# Patient Record
Sex: MALE | Race: WHITE | NOT HISPANIC OR LATINO | Employment: FULL TIME | URBAN - METROPOLITAN AREA
[De-identification: names, ages, dates, MRNs, and addresses within clinical notes are randomized per-mention and may not be internally consistent; named-entity substitution may affect disease eponyms.]

---

## 2019-08-03 ENCOUNTER — OFFICE VISIT (OUTPATIENT)
Dept: URGENT CARE | Facility: PHYSICIAN GROUP | Age: 54
End: 2019-08-03
Payer: MEDICARE

## 2019-08-03 VITALS
SYSTOLIC BLOOD PRESSURE: 138 MMHG | DIASTOLIC BLOOD PRESSURE: 88 MMHG | OXYGEN SATURATION: 94 % | BODY MASS INDEX: 33.24 KG/M2 | HEART RATE: 91 BPM | WEIGHT: 259 LBS | HEIGHT: 74 IN | TEMPERATURE: 98.8 F

## 2019-08-03 DIAGNOSIS — L03.031 CELLULITIS OF TOE OF RIGHT FOOT: ICD-10-CM

## 2019-08-03 PROCEDURE — 99204 OFFICE O/P NEW MOD 45 MIN: CPT | Performed by: NURSE PRACTITIONER

## 2019-08-03 RX ORDER — CEPHALEXIN 500 MG/1
500 CAPSULE ORAL 3 TIMES DAILY
Qty: 30 CAP | Refills: 0 | Status: SHIPPED | OUTPATIENT
Start: 2019-08-03 | End: 2019-08-13

## 2019-08-03 RX ORDER — QUETIAPINE FUMARATE 300 MG/1
300 TABLET, FILM COATED ORAL 2 TIMES DAILY
COMMUNITY
End: 2022-03-25 | Stop reason: SDUPTHER

## 2019-08-03 RX ORDER — SULFAMETHOXAZOLE AND TRIMETHOPRIM 800; 160 MG/1; MG/1
1 TABLET ORAL 2 TIMES DAILY
Qty: 20 TAB | Refills: 0 | Status: SHIPPED | OUTPATIENT
Start: 2019-08-03 | End: 2019-08-13

## 2019-08-03 ASSESSMENT — PATIENT HEALTH QUESTIONNAIRE - PHQ9: CLINICAL INTERPRETATION OF PHQ2 SCORE: 0

## 2019-08-03 ASSESSMENT — ENCOUNTER SYMPTOMS
EYE PAIN: 0
NECK PAIN: 0
NAUSEA: 0
JOINT SWELLING: 0
WEAKNESS: 0
MYALGIAS: 0
ABDOMINAL PAIN: 0
FEVER: 0
DIZZINESS: 0
SHORTNESS OF BREATH: 0
SORE THROAT: 0
NUMBNESS: 0
VOMITING: 0
CHILLS: 0

## 2019-08-03 ASSESSMENT — PAIN SCALES - GENERAL: PAINLEVEL: 3=SLIGHT PAIN

## 2019-08-03 NOTE — LETTER
August 3, 2019         Patient: Rojas Love   YOB: 1965   Date of Visit: 8/3/2019           To Whom it May Concern:    Rojas Love was seen in my clinic on 8/3/2019. He may return to work on 8/6/19.    If you have any questions or concerns, please don't hesitate to call.        Sincerely,           CLAU Tatum  Electronically Signed

## 2019-08-03 NOTE — PROGRESS NOTES
Subjective:     Rojas Love is a 53 y.o. male who presents for Toe Pain (right 3rd digit)       Foot Problem   This is a new problem. The current episode started 1 to 4 weeks ago (infection of 4th toe of right foot. ). The problem occurs constantly. The problem has been unchanged. Pertinent negatives include no abdominal pain, chest pain, chills, fever, joint swelling (right 4th toe with redness and swellilng), myalgias, nausea, neck pain, numbness, rash, sore throat, vomiting or weakness. The symptoms are aggravated by walking (wearing boot for work). He has tried acetaminophen (abx 3 weeks ago, which helped, infection reoccured 3 days after abx stopped) for the symptoms. The treatment provided no relief.   History reviewed. No pertinent past medical history.History reviewed. No pertinent surgical history.  Social History     Socioeconomic History   • Marital status:      Spouse name: Not on file   • Number of children: Not on file   • Years of education: Not on file   • Highest education level: Not on file   Occupational History   • Not on file   Social Needs   • Financial resource strain: Not on file   • Food insecurity:     Worry: Not on file     Inability: Not on file   • Transportation needs:     Medical: Not on file     Non-medical: Not on file   Tobacco Use   • Smoking status: Current Every Day Smoker   • Smokeless tobacco: Never Used   Substance and Sexual Activity   • Alcohol use: Not on file   • Drug use: Not on file   • Sexual activity: Not on file   Lifestyle   • Physical activity:     Days per week: Not on file     Minutes per session: Not on file   • Stress: Not on file   Relationships   • Social connections:     Talks on phone: Not on file     Gets together: Not on file     Attends Oriental orthodox service: Not on file     Active member of club or organization: Not on file     Attends meetings of clubs or organizations: Not on file     Relationship status: Not on file   • Intimate partner  "violence:     Fear of current or ex partner: Not on file     Emotionally abused: Not on file     Physically abused: Not on file     Forced sexual activity: Not on file   Other Topics Concern   • Not on file   Social History Narrative   • Not on file    History reviewed. No pertinent family history. Review of Systems   Constitutional: Negative for chills and fever.   HENT: Negative for sore throat.    Eyes: Negative for pain.   Respiratory: Negative for shortness of breath.    Cardiovascular: Negative for chest pain.   Gastrointestinal: Negative for abdominal pain, nausea and vomiting.   Genitourinary: Negative for hematuria.   Musculoskeletal: Positive for joint pain. Negative for joint swelling (right 4th toe with redness and swellilng), myalgias and neck pain.   Skin: Negative for rash.   Neurological: Negative for dizziness, weakness and numbness.   No Known Allergies   Objective:   /88 (BP Location: Left arm, Patient Position: Sitting, BP Cuff Size: Adult)   Pulse 91   Temp 37.1 °C (98.8 °F)   Ht 1.88 m (6' 2\")   Wt 117.5 kg (259 lb)   SpO2 94%   BMI 33.25 kg/m²   Physical Exam   Constitutional: He is oriented to person, place, and time. He appears well-developed and well-nourished. No distress.   HENT:   Head: Normocephalic and atraumatic.   Eyes: Pupils are equal, round, and reactive to light. Conjunctivae and EOM are normal.   Cardiovascular: Normal rate and regular rhythm.   No murmur heard.  Pulmonary/Chest: Effort normal and breath sounds normal. No respiratory distress.   Abdominal: Soft. He exhibits no distension. There is no tenderness.   Musculoskeletal:        Right foot: There is tenderness and swelling. There is normal range of motion and no bony tenderness.        Feet:    Neurological: He is alert and oriented to person, place, and time. He has normal reflexes. No sensory deficit.   Skin: Skin is warm and dry.   Psychiatric: He has a normal mood and affect.         Assessment/Plan: "   Assessment    1. Cellulitis of toe of right foot  sulfamethoxazole-trimethoprim (BACTRIM DS) 800-160 MG tablet    cephALEXin (KEFLEX) 500 MG Cap   Patient is a 53-year-old male present with the stated above.  Right fourth phalanx appears to be infected consistent with cellulitis.  Patient previously treated with doxycycline at this time will start patient on Bactrim and Keflex.  Use over-the-counter pain reliever, such as acetaminophen (Tylenol), ibuprofen (Advil, Motrin) or naproxen (Aleve) as needed; follow package directions for dosing.  Patient given precautionary s/sx that mandate immediate follow up and evaluation in the ED. Advised of risks of not doing so.    DDX, Supportive care, and indications for immediate follow-up discussed with patient.    Instructed to return to clinic or nearest emergency department if we are not available for any change in condition, further concerns, or worsening of symptoms.    The patient demonstrated a good understanding and agreed with the treatment plan.

## 2019-08-26 ENCOUNTER — HOSPITAL ENCOUNTER (OUTPATIENT)
Dept: RADIOLOGY | Facility: MEDICAL CENTER | Age: 54
End: 2019-08-26
Attending: FAMILY MEDICINE
Payer: MEDICARE

## 2019-08-26 ENCOUNTER — OFFICE VISIT (OUTPATIENT)
Dept: URGENT CARE | Facility: PHYSICIAN GROUP | Age: 54
End: 2019-08-26
Payer: MEDICARE

## 2019-08-26 VITALS
WEIGHT: 259 LBS | RESPIRATION RATE: 16 BRPM | HEART RATE: 78 BPM | HEIGHT: 74 IN | TEMPERATURE: 97.9 F | BODY MASS INDEX: 33.24 KG/M2 | OXYGEN SATURATION: 97 % | SYSTOLIC BLOOD PRESSURE: 146 MMHG | DIASTOLIC BLOOD PRESSURE: 84 MMHG

## 2019-08-26 DIAGNOSIS — L03.031 CELLULITIS OF TOE OF RIGHT FOOT: ICD-10-CM

## 2019-08-26 PROCEDURE — 99213 OFFICE O/P EST LOW 20 MIN: CPT | Performed by: FAMILY MEDICINE

## 2019-08-26 PROCEDURE — 73660 X-RAY EXAM OF TOE(S): CPT | Mod: RT

## 2019-08-26 ASSESSMENT — ENCOUNTER SYMPTOMS
FEVER: 0
VOMITING: 0
SHORTNESS OF BREATH: 0

## 2019-08-26 ASSESSMENT — PAIN SCALES - GENERAL: PAINLEVEL: 6=MODERATE PAIN

## 2019-08-26 NOTE — PROGRESS NOTES
"Subjective:     Rojas Love is a 53 y.o. male who presents for Toe Pain (finished antibiotics, redness and swelling right toe )    HPI  Pt presents for evaluation of a recurrent problem   Pt with left great toe callus and right middle toe redness   Right middle telemetry cellulitis started the first week of June 2019 and [facility] and treated with doxycycline  Felt that the antibiotics helped but then erythema returned  Was given Bactrim and Keflex 3 weeks ago   Abx were a 10 day course and helped greatly, but again erythema returned after discontinuing antibiotics  Patient with a sore on the lateral aspect that he blames on a rubbing on the fourth toe  Has had some clear and purulent drainage from the toe  Toe is constantly painful and worse with ambulation    Review of Systems   Constitutional: Negative for fever.   Respiratory: Negative for shortness of breath.    Cardiovascular: Negative for chest pain.   Gastrointestinal: Negative for vomiting.   Skin: Positive for rash.     PMH: No past history of diabetes, + history of bipolar 2  MEDS:   Current Outpatient Medications:   •  Acetaminophen (TYLENOL 8 HOUR PO), Take  by mouth., Disp: , Rfl:   •  quetiapine (SEROQUEL) 300 MG tablet, Take 300 mg by mouth 2 times a day., Disp: , Rfl:   ALLERGIES: No Known Allergies  SURGHX: No past surgical history on file.  SOCHX:  reports that he has been smoking cigarettes. He has been smoking about 1.00 pack per day. He has never used smokeless tobacco. He reports that he drinks alcohol. He reports that he does not use drugs.  FH: Family history was reviewed, not contributing to acute infection     Objective:   /84   Pulse 78   Temp 36.6 °C (97.9 °F)   Resp 16   Ht 1.88 m (6' 2\")   Wt 117.5 kg (259 lb)   SpO2 97%   BMI 33.25 kg/m²     Physical Exam   Constitutional: He is oriented to person, place, and time. He appears well-developed and well-nourished. No distress.   HENT:   Head: " Normocephalic and atraumatic.   Neurological: He is alert and oriented to person, place, and time.   Skin: Skin is warm and dry. He is not diaphoretic.   Right third toe erythematous, warm, and with callus over the lateral aspect which is soft and has small amount of cracking and clear drainage in the center, no fluctuance or abscess appreciated   Psychiatric: He has a normal mood and affect. His behavior is normal. Judgment and thought content normal.     Assessment/Plan:   Assessment    1. Cellulitis of toe of right foot  Patient is a 53-year-old male with cellulitis of right middle toe.  He has been treated with multiple rounds of antibiotics which have helped some but not fully resolved his infection.  X-ray highly concerning for osteomyelitis and advised that he will likely need IV antibiotics to fully resolve this and may even need surgical intervention.  Patient is agreeable to be seen in [facility] and called ER to give report.  - DX-TOE(S) 2+ RIGHT; Future

## 2022-03-25 ENCOUNTER — OFFICE VISIT (OUTPATIENT)
Dept: URGENT CARE | Facility: PHYSICIAN GROUP | Age: 57
End: 2022-03-25
Payer: MEDICARE

## 2022-03-25 VITALS
SYSTOLIC BLOOD PRESSURE: 152 MMHG | TEMPERATURE: 98.8 F | RESPIRATION RATE: 16 BRPM | DIASTOLIC BLOOD PRESSURE: 90 MMHG | OXYGEN SATURATION: 96 % | WEIGHT: 280 LBS | BODY MASS INDEX: 35.94 KG/M2 | HEART RATE: 90 BPM | HEIGHT: 74 IN

## 2022-03-25 DIAGNOSIS — F31.81 BIPOLAR 2 DISORDER (HCC): ICD-10-CM

## 2022-03-25 PROCEDURE — 99213 OFFICE O/P EST LOW 20 MIN: CPT | Performed by: FAMILY MEDICINE

## 2022-03-25 RX ORDER — QUETIAPINE FUMARATE 300 MG/1
300 TABLET, FILM COATED ORAL
Qty: 30 TABLET | Refills: 0 | Status: SHIPPED | OUTPATIENT
Start: 2022-03-25 | End: 2022-06-27 | Stop reason: SDUPTHER

## 2022-03-26 NOTE — PROGRESS NOTES
"Chief Complaint   Patient presents with   • Medication Refill     Needs Rx filled up.           HPI:      Here for medication refill on seroquel.   He has been on this for many years for bipolar dx.   States this works well and he denies any side effects.     States mood is stable, currently/.    Denies any depression or manic sx.       Social History     Tobacco Use   • Smoking status: Current Every Day Smoker     Packs/day: 1.00     Types: Cigarettes   • Smokeless tobacco: Never Used   Vaping Use   • Vaping Use: Never used   Substance Use Topics   • Alcohol use: Yes     Comment: occational   • Drug use: Never             Review of Systems   Constitutional: Negative for fever, chills and malaise/fatigue.   Eyes: Negative for vision changes, d/c.    Respiratory: Negative for cough and sputum production.    Cardiovascular: Negative for chest pain and palpitations.   Gastrointestinal: Negative for nausea, vomiting, abdominal pain, diarrhea and constipation.   Genitourinary: Negative for dysuria, urgency and frequency.   Skin: Negative for rash or  itching.   Neurological: Negative for dizziness and tingling.   Psychiatric/Behavioral: Negative for depression.   Hematologic/lymphatic - denies bruising or excessive bleeding  All other systems reviewed and are negative.      OBJECTIVE.  /90   Pulse 90   Temp 37.1 °C (98.8 °F)   Resp 16   Ht 1.88 m (6' 2\")   Wt (!) 127 kg (280 lb)   SpO2 96%   HEENT - PERRLA, EOMI  Neuro - alert and oriented x3. CN 2-12 grossly intact.  Lungs - CTA.    Heart - regular rate and rhythm without murmur.  Psych:   Mood - good. Affect - appropriate.   No SI.   Thought process in linear and goal-directed.   Speech is normal rate and rhythm.  Judgement is good.         A/P:  1. Bipolar 2, chronic    Stable.     - quetiapine (SEROQUEL) 300 MG tablet; Take 1 Tablet by mouth at bedtime as needed.  Dispense: 30 Tablet; Refill: 0  - Referral to Behavioral Health      "

## 2022-04-27 ENCOUNTER — OFFICE VISIT (OUTPATIENT)
Dept: MEDICAL GROUP | Facility: CLINIC | Age: 57
End: 2022-04-27
Payer: MEDICARE

## 2022-04-27 VITALS
TEMPERATURE: 97.1 F | BODY MASS INDEX: 36.51 KG/M2 | HEIGHT: 74 IN | HEART RATE: 96 BPM | OXYGEN SATURATION: 96 % | DIASTOLIC BLOOD PRESSURE: 90 MMHG | WEIGHT: 284.5 LBS | SYSTOLIC BLOOD PRESSURE: 150 MMHG

## 2022-04-27 DIAGNOSIS — F31.81 BIPOLAR 2 DISORDER (HCC): ICD-10-CM

## 2022-04-27 DIAGNOSIS — Z00.00 HEALTH CARE MAINTENANCE: ICD-10-CM

## 2022-04-27 PROCEDURE — 99203 OFFICE O/P NEW LOW 30 MIN: CPT | Performed by: FAMILY MEDICINE

## 2022-04-27 RX ORDER — QUETIAPINE FUMARATE 300 MG/1
TABLET, FILM COATED ORAL
Qty: 90 TABLET | Refills: 0 | Status: SHIPPED | OUTPATIENT
Start: 2022-04-27 | End: 2022-10-10 | Stop reason: SDUPTHER

## 2022-04-27 NOTE — ASSESSMENT & PLAN NOTE
Checking lipids, CHEM panel, CBC and TSH.  I have asked Rojas to return in about 2 months to follow-up.  I will notify you of the results through [employer] before then though.

## 2022-04-27 NOTE — ASSESSMENT & PLAN NOTE
I will refill the Seroquel, 3 month supply.  I did ask if he is willing to meet with a Psychiatrist at least once.   He is agreeable, referral placed  F/u with me in 2 months.  Labs ordered.

## 2022-04-27 NOTE — PROGRESS NOTES
"Subjective:     CC:  [employer], sleep    HPI:   Rojas presents today to discuss the following issues     Problem   Health Care Maintenance    Has not had labs for maybe a couple of years.  He had what sounds like a Cologuard test several years ago that was negative.     Bipolar 2 Disorder (Hcc)    Blayne has been on Seroquel for about 20 years.  He was seen by psychiatry in a different state and diagnosed with bipolar disorder.  After a number of different medications were tried, including what sounds like lithium, quetiapine was seem to have the best effect with minimal side effects.  He has been using this on a regular basis but had been unable to get into see us to get his medicines refilled and ran out a couple of days ago.  His main concern is been of poor sleep since then.  He has not had any labs done for a couple of years and has not seen a psychiatrist for a while.           Current Outpatient Medications Ordered in Epic   Medication Sig Dispense Refill   • quetiapine (SEROQUEL) 300 MG tablet 90 90 Tablet 0   • quetiapine (SEROQUEL) 300 MG tablet Take 1 Tablet by mouth at bedtime as needed. 30 Tablet 0   • Acetaminophen (TYLENOL 8 HOUR PO) Take  by mouth.       No current Epic-ordered facility-administered medications on file.       Health Maintenance:     ROS:  Gen: no fevers/chills, no changes in weight  Eyes: no changes in vision  ENT: no sore throat, no hearing loss, no bloody nose  Pulm: no sob, no cough  CV: no chest pain, no palpitations  GI: no nausea/vomiting, no diarrhea  : no dysuria  MSk: no myalgias  Skin: no rash  Neuro: no headaches, no numbness/tingling  Heme/Lymph: no easy bruising      Objective:     Exam:  /90 (BP Location: Right arm, Patient Position: Sitting, BP Cuff Size: Adult)   Pulse 96   Temp 36.2 °C (97.1 °F) (Temporal)   Ht 1.88 m (6' 2\")   Wt (!) 129 kg (284 lb 8 oz)   SpO2 96%   BMI 36.53 kg/m²  Body mass index is 36.53 kg/m².    Gen: Alert and oriented, No apparent " distress.  Neck: Neck is supple without lymphadenopathy.  Lungs: Normal effort, CTA bilaterally, no wheezes, rhonchi, or rales  CV: Regular rate and rhythm. No murmurs, rubs, or gallops.  Ext: No clubbing, cyanosis, edema.          Assessment & Plan:     56 y.o. male with the following -     Problem List Items Addressed This Visit     Bipolar 2 disorder (HCC)     I will refill the Seroquel, 3 month supply.  I did ask if he is willing to meet with a Psychiatrist at least once.   He is agreeable, referral placed  F/u with me in 2 months.  Labs ordered.         Relevant Orders    Referral to Psychiatry    Comp Metabolic Panel    CBC WITH DIFFERENTIAL    Lipid Profile    TSH WITH REFLEX TO FT4    Health care maintenance     Checking lipids, CHEM panel, CBC and TSH.  I have asked Rojas to return in about 2 months to follow-up.  I will notify you of the results through [employer] before then though.         Relevant Orders    TSH WITH REFLEX TO FT4            No follow-ups on file.

## 2022-04-28 RX ORDER — QUETIAPINE FUMARATE 300 MG/1
300 TABLET, FILM COATED ORAL
Qty: 30 TABLET | Refills: 0 | OUTPATIENT
Start: 2022-04-28

## 2022-06-27 DIAGNOSIS — F31.81 BIPOLAR 2 DISORDER (HCC): ICD-10-CM

## 2022-06-27 NOTE — TELEPHONE ENCOUNTER
Patient will be out of town for 3 months for a job and would like a refill on this medication for 90 days. He had an appointment set up but had to cancel it due to the job he took. Please advise, Patient did said he will schedule an appointment when he gets back into town.     Received request via: Patient    Was the patient seen in the last year in this department? Yes    Does the patient have an active prescription (recently filled or refills available) for medication(s) requested? No

## 2022-06-28 RX ORDER — QUETIAPINE FUMARATE 300 MG/1
300 TABLET, FILM COATED ORAL
Qty: 90 TABLET | Refills: 0 | Status: SHIPPED | OUTPATIENT
Start: 2022-06-28 | End: 2022-09-26

## 2022-06-28 NOTE — TELEPHONE ENCOUNTER
Patient called again needing their medication because they are going out of town. I let them know I will forward to another provider because Hernandez might be out of office.

## 2022-10-10 ENCOUNTER — TELEPHONE (OUTPATIENT)
Dept: INTERNAL MEDICINE | Facility: OTHER | Age: 57
End: 2022-10-10

## 2022-10-10 ENCOUNTER — OFFICE VISIT (OUTPATIENT)
Dept: INTERNAL MEDICINE | Facility: OTHER | Age: 57
End: 2022-10-10
Payer: MEDICARE

## 2022-10-10 VITALS
HEART RATE: 88 BPM | DIASTOLIC BLOOD PRESSURE: 90 MMHG | SYSTOLIC BLOOD PRESSURE: 162 MMHG | WEIGHT: 291 LBS | TEMPERATURE: 98.1 F | HEIGHT: 74 IN | BODY MASS INDEX: 37.35 KG/M2 | OXYGEN SATURATION: 96 %

## 2022-10-10 DIAGNOSIS — I10 PRIMARY HYPERTENSION: ICD-10-CM

## 2022-10-10 DIAGNOSIS — N52.9 ERECTILE DYSFUNCTION, UNSPECIFIED ERECTILE DYSFUNCTION TYPE: ICD-10-CM

## 2022-10-10 DIAGNOSIS — F10.11 HISTORY OF ALCOHOL ABUSE: ICD-10-CM

## 2022-10-10 DIAGNOSIS — F31.81 BIPOLAR 2 DISORDER (HCC): ICD-10-CM

## 2022-10-10 DIAGNOSIS — Z00.00 PREVENTATIVE HEALTH CARE: ICD-10-CM

## 2022-10-10 PROCEDURE — G0008 ADMIN INFLUENZA VIRUS VAC: HCPCS

## 2022-10-10 PROCEDURE — 99214 OFFICE O/P EST MOD 30 MIN: CPT | Mod: 25,GC

## 2022-10-10 PROCEDURE — 90686 IIV4 VACC NO PRSV 0.5 ML IM: CPT

## 2022-10-10 RX ORDER — SILDENAFIL 50 MG/1
50 TABLET, FILM COATED ORAL
Qty: 9 TABLET | Refills: 3 | Status: SHIPPED | OUTPATIENT
Start: 2022-10-10 | End: 2022-11-23

## 2022-10-10 RX ORDER — QUETIAPINE FUMARATE 300 MG/1
TABLET, FILM COATED ORAL
Qty: 90 TABLET | Refills: 0 | Status: SHIPPED | OUTPATIENT
Start: 2022-10-10 | End: 2022-10-11

## 2022-10-10 RX ORDER — LISINOPRIL AND HYDROCHLOROTHIAZIDE 12.5; 1 MG/1; MG/1
1 TABLET ORAL DAILY
Qty: 90 TABLET | Refills: 0 | Status: SHIPPED | OUTPATIENT
Start: 2022-10-10 | End: 2023-01-11 | Stop reason: SDUPTHER

## 2022-10-10 NOTE — TELEPHONE ENCOUNTER
Patient called and is asking if you can sne din his medication for one po at bed time. The pharmacy won't fill it. Med is QUETIAPINE

## 2022-10-10 NOTE — PATIENT INSTRUCTIONS
Thank you for visiting today!  Please follow-up in 4 weeks   Please follow-up on referrals and schedule an appointment with a psychiatrist.   Please get lab work done at least 5 days before next visit.  Please try and eat healthy, get at least 30 minutes of cardiovascular exercise a day to help keep your health as best as it can be.    If you have any questions or concerns please feel free to contact us at [phone].  If you feel like you are experiencing a medical emergency please seek immediate medical attention at an urgent care or in the emergency department.

## 2022-10-10 NOTE — PROGRESS NOTES
Established Patient    Rojas Love is a 56 y.o. male who presents today with the following Chief Complaint(s): Refill of quetiapine 300 mg and to establish care with a new PCP.     HPI:  Mr Rojas Love is a 55 y/o M who was being seen by seen by Dr. Fadi Colindres M.D. who presented here today because he needs his medications refilled and to establish care with a new PCP.     He has a hx of bipolar 2 disorder and has been on quetiapine 300mg since 2002 without any problems. He says he is stable on that medication and dose and does not want to change. In 2002 when the diagnosis was made he was admitted to a psychiatric facility and was started on lithium but his sx did not resolve until placed on quetiapine.      He also noted a new onset of erectile dysfunction and requested something to help with that. We discussed that his smoking is likely contributing to this.     Pt has a long history of alcohol abuse, however, he quit 7 months ago after his wife went to rehab. He wanted to be supportive of her and has stopped drinking so that she can stay sober as well. He has had 0 drinks for the past 7 months and intends to stay sober.     Pt also has a 20 pk/yr smoking history. He said he has tried to quit before but hasn't been able to and is not interested in quitting at this time.     Preventative:   We discussed diet and exercise. He is fairly active as he works a s a [employer] but does not take time to exercise specifically. His diet is high in salt and he loves mexican food. He requested flu shot today.       No past medical history on file.  Social History     Tobacco Use    Smoking status: Every Day     Packs/day: 1.00     Types: Cigarettes    Smokeless tobacco: Never   Vaping Use    Vaping Use: Never used   Substance Use Topics    Alcohol use: Yes     Comment: occational    Drug use: Never     Current Outpatient Medications   Medication Sig Dispense Refill    quetiapine (SEROQUEL) 300 MG tablet 90 90  "Tablet 0    Acetaminophen (TYLENOL 8 HOUR PO) Take  by mouth.       No current facility-administered medications for this visit.     History reviewed. No pertinent surgical history.   Patient has no known allergies.     ROS: As per HPI. Additional pertinent systems as noted below.  Constitutional: Negative for chills and fever.   HENT: Negative for ear pain and sore throat.    Eyes: Negative for discharge and redness.   Respiratory: Negative for cough, hemoptysis, wheezing and stridor.  Positive for runny nose (allergies)   Cardiovascular: Negative for chest pain, palpitations and leg swelling.   Gastrointestinal: Negative for abdominal pain, constipation, diarrhea, heartburn, nausea and vomiting.   Genitourinary: Negative for dysuria, flank pain and hematuria.   Musculoskeletal: Negative for falls and myalgias.   Skin: Negative for itching and rash.   Neurological: Negative for dizziness, seizures, loss of consciousness and headaches.   Endo/Heme/Allergies: Negative for polydipsia. Does not bruise/bleed easily.   Psychiatric/Behavioral: Negative for substance abuse and suicidal ideas.     Physical Exam  BP (!) 162/90 (BP Location: Left arm, Patient Position: Sitting, BP Cuff Size: Adult)   Pulse 88   Temp 36.7 °C (98.1 °F) (Temporal)   Ht 1.88 m (6' 2\")   Wt (!) 132 kg (291 lb)   SpO2 96%   BMI 37.36 kg/m²   General:  Alert and oriented, No apparent distress. Overweight.     Eyes: Pupils equal and reactive. No scleral icterus.    Throat: Clear and moist. No erythema or exudates noted.    Neck: Supple. No lymphadenopathy noted. Thyroid not enlarged.    Lungs: Inspiratory wheezing at all lung posts. Bilateral expansion.     Cardiovascular: Regular rate and rhythm. No murmurs, rubs or gallops.    Abdomen:  Regular bowel sounds, nontender, no rebound or guarding noted.    Extremities: No clubbing, cyanosis, edema.    Skin: Clear. No rash or suspicious skin lesions noted.      Assessment and Plan:   1. Bipolar 2 " disorder  -Initially dxed in 2002 and has been on quetiapine 300mg since then.   Plan:   -refilled quetiapine 300mg.   -Referral to psychology to ensure pt stable.     2. Hypertension   -consistently elevated BP over previous visit.   -has not checked BP at home   -Repeat BP still elevated   Plan:   -Prescribed Lisinopril HCTZ combo   -Requested pt take blood pressure at home and bring log to our next visit.   -recommended low salt diet and discussed ways pt could decrease salt intake.     3. Erectile dysfunction   -sildenafil 50 as needed.     4. Preventative  -Patient will need CT for lung cancer screening   -will need pneumonia vaccine   Plan:   -We discussed diet/exercise. I advised reducing sugars/carbohydrates/saturated fats/alcohol, and eating more vegetables and lean meats such as fish/chicken/turkey. I also recommended 30 minutes of cardiovascular exercise most days of the week.  -We discussed smoking, patient not interested in quitting at this time.   -Flu shot today   -Labs: CBC, CMP, Lipid Panel, A1C         Follow up: 4 weeks     Merlin Zazueta D.O. PGY I  [facility]        At next appointment:   -f/u on CBC, CMP, Lipid Panel, A1C   -Blood pressure   -Diet   -pneumonia vaccine   -CT lungs

## 2022-10-10 NOTE — TELEPHONE ENCOUNTER
Pharmacy wasn't able to fill quetiapine because there weren't any instructions.  1 pill at bedtime.

## 2022-10-11 RX ORDER — QUETIAPINE FUMARATE 300 MG/1
300 TABLET, FILM COATED ORAL EVERY EVENING
Qty: 90 TABLET | Refills: 0 | Status: SHIPPED | OUTPATIENT
Start: 2022-10-11 | End: 2023-01-11 | Stop reason: SDUPTHER

## 2022-10-11 NOTE — TELEPHONE ENCOUNTER
Merlin Zazueta D.O.  You 2 hours ago (9:19 AM)     AN  Resent prescription with instructions.

## 2022-11-23 ENCOUNTER — OFFICE VISIT (OUTPATIENT)
Dept: INTERNAL MEDICINE | Facility: OTHER | Age: 57
End: 2022-11-23
Payer: MEDICARE

## 2022-11-23 VITALS
BODY MASS INDEX: 37.22 KG/M2 | WEIGHT: 290 LBS | SYSTOLIC BLOOD PRESSURE: 129 MMHG | HEART RATE: 100 BPM | DIASTOLIC BLOOD PRESSURE: 77 MMHG | TEMPERATURE: 98.3 F | HEIGHT: 74 IN | OXYGEN SATURATION: 97 %

## 2022-11-23 DIAGNOSIS — N52.9 ERECTILE DYSFUNCTION, UNSPECIFIED ERECTILE DYSFUNCTION TYPE: ICD-10-CM

## 2022-11-23 DIAGNOSIS — I10 PRIMARY HYPERTENSION: ICD-10-CM

## 2022-11-23 PROBLEM — Z00.00 HEALTH CARE MAINTENANCE: Status: RESOLVED | Noted: 2022-04-27 | Resolved: 2022-11-23

## 2022-11-23 PROCEDURE — 99214 OFFICE O/P EST MOD 30 MIN: CPT | Mod: GC

## 2022-11-23 RX ORDER — AMOXICILLIN 500 MG/1
CAPSULE ORAL
COMMUNITY
Start: 2022-11-12 | End: 2023-01-11

## 2022-11-23 RX ORDER — TADALAFIL 10 MG/1
20 TABLET ORAL
Qty: 10 TABLET | Refills: 3 | Status: SHIPPED | OUTPATIENT
Start: 2022-11-23 | End: 2023-01-11 | Stop reason: SDUPTHER

## 2022-11-23 NOTE — PATIENT INSTRUCTIONS
Thank you for visiting today!  Please follow-up in 6 weeks   Please follow-up on referrals and schedule an appointment with a nutritionist and with a pulmonologist for a sleep study.   Please get lab work done at least 5 days before next visit.    Please try and eat healthy, get at least 30 minutes of cardiovascular exercise a day to help keep your health as best as it can be. I highly recommend stopping drinking Soda and trying water flavoring instead (you can pick it up at [employer] for about $1).     If you have any questions or concerns please feel free to contact us at [phone].  If you feel like you are experiencing a medical emergency please seek immediate medical attention at an urgent care or in the emergency department.

## 2022-11-23 NOTE — PROGRESS NOTES
Established Patient    Rojas Love is a 57 y.o. male who presents today with the following Chief Complaint(s): Follow up for Diagnoses of Primary hypertension and Erectile dysfunction, unspecified erectile dysfunction type were pertinent to this visit.    HPI:  Hypertension:   He reported taking his medication regularly and said that when he has checked his BP at home it has been 'regular' with SBP in 120s. He said that he used to have frequent headaches and that since starting taking his BP medication his headaches have almost completely resolved. No sx lightheadedness or dizziness.     Erectile Dysfunction:   His main concern today is that he is still having difficulty with his ED. He tried taking the sildenafil and said that it didn't work. He then tried two pills and later tried 4 pills with hardly any affect. He said that he is in a good relationship with his wife and loves her very much. They have been together 12-13 years and he did not start having difficulty having an erection until about 3 years ago. He did report increased weight gain since that time.     Bipolar 2 Disorder:   At our previous appointment he expressed high stress, anxiety, and depression related to current financial situation. 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 He is very happy with current job, has a good support system, feels that he has a good relationship his wife, and is excited to spend time with his family tonight (for his wife's birthday) and tomorrow for Thanksgiving.     No past medical history on file.  Social History     Tobacco Use    Smoking status: Every Day     Packs/day: 1.00     Types: Cigarettes    Smokeless tobacco: Never   Vaping Use    Vaping Use: Never used   Substance Use Topics    Alcohol use: Not Currently     Comment: occational until around 7/2022 he quit to support his wife quitting.    Drug use:  "Never     Current Outpatient Medications   Medication Sig Dispense Refill    quetiapine (SEROQUEL) 300 MG tablet Take 1 Tablet by mouth every evening. 90 90 Tablet 0    sildenafil citrate (VIAGRA) 50 MG tablet Take 1 Tablet by mouth 1 time a day as needed for Erectile Dysfunction. 9 Tablet 3    lisinopril-hydrochlorothiazide (PRINZIDE) 10-12.5 MG per tablet Take 1 Tablet by mouth every day. 90 Tablet 0    Acetaminophen (TYLENOL 8 HOUR PO) Take  by mouth. (Patient not taking: Reported on 10/10/2022)       No current facility-administered medications for this visit.       ROS: As per HPI. Additional pertinent systems as noted below.  Constitutional: Negative for chills and fever.   HENT: Negative for ear pain and sore throat.    Eyes: Negative for discharge and redness.   Respiratory: Negative for cough, hemoptysis, wheezing and stridor.    Cardiovascular: Negative for chest pain, palpitations and leg swelling.   Gastrointestinal: Negative for abdominal pain, constipation, diarrhea, heartburn, nausea and vomiting.   Genitourinary: Negative for dysuria, flank pain and hematuria. Positive for erectile dysfunction.   Musculoskeletal: Negative for falls and myalgias.   Skin: Negative for itching and rash.   Neurological: Negative for dizziness, seizures, loss of consciousness and headaches.   Endo/Heme/Allergies: Negative for polydipsia. Does not bruise/bleed easily.   Psychiatric/Behavioral: Negative for substance abuse and suicidal ideas.     Physical Exam  /77 (BP Location: Left arm, Patient Position: Sitting, BP Cuff Size: Large adult)   Pulse 100   Temp 36.8 °C (98.3 °F) (Temporal)   Ht 1.88 m (6' 2\")   Wt (!) 132 kg (290 lb)   SpO2 97%   BMI 37.23 kg/m²   General:  Alert and oriented, No apparent distress.    HEENT: Normocephalic, atraumatic. No scleral icterus. Clear and moist. No erythema or exudates noted.    Neck: Supple. No lymphadenopathy noted. Trachea midline. Thyroid not enlarged.    Lungs: " Bilateral chest expansion. No rales, or rhonchi. Mild end-expiratory wheezing at lower lobes bilaterally.     Cardiovascular: Regular rate and rhythm. No murmurs, rubs or gallops.    Abdomen:  Regular bowel sounds, nontender, no rebound or guarding noted.    Extremities: No clubbing, cyanosis, edema.    Skin: Clear. No rash or suspicious skin lesions noted.      Assessment and Plan:   1. Primary hypertension  -Reported home BP of systolic 120s and said that he has decreased the amount of salt he adds to his food.   -He also noted that his headaches have almost completely resolved since getting his BP under control.   Plan:   -Encouraged to continue healthy diet decisions.   -Continue current medication (lisinopril-HCTZ combo)    2. Erectile dysfunction, unspecified erectile dysfunction type  Ddx low testosterone, weight, smoking, high cholesterol, DM, sleep disorder   -Sx not improved with sildenafil. Pt said he took 200mg (higher than recommended dose) and it hardly affected him.   -Do not think sx are psychological as he said that he is happy with his current relationship, loves his wife very much, and did not used to have difficulty when they were sexually active.   Plan:   -Discussed weight loss through diet and exercise. Discussed decreasing soda intake and trying water additives instead.   -Discussed how his sx are related to smoking and discussed quitting smoking, pt not ready to quit at this time.   -ordered serum testosterone level   -Lipid panel pending   -A1C pending   -Referral to Pulmonary and Sleep Medicine to assess for sleep apnea   -Discontinued sildenafil and prescribed tadalafil (CIALIS) 20 MG       Follow up: 1 month     Merlin Zazueta D.O. PGY I  [facility]      At next appointment:   -Review labs (CBC, CMP, Lipid Panel, A1C, Testosterone)   -Diet   -CT thorax for lung cancer screening   -Pneumonia vaccine   -Nutritionist referral?

## 2023-01-11 ENCOUNTER — OFFICE VISIT (OUTPATIENT)
Dept: INTERNAL MEDICINE | Facility: OTHER | Age: 58
End: 2023-01-11
Payer: MEDICARE

## 2023-01-11 VITALS
OXYGEN SATURATION: 94 % | DIASTOLIC BLOOD PRESSURE: 77 MMHG | BODY MASS INDEX: 37.09 KG/M2 | HEIGHT: 74 IN | HEART RATE: 84 BPM | TEMPERATURE: 98.3 F | SYSTOLIC BLOOD PRESSURE: 127 MMHG | WEIGHT: 289 LBS

## 2023-01-11 DIAGNOSIS — R20.0 NUMBNESS AND TINGLING OF BOTH FEET: ICD-10-CM

## 2023-01-11 DIAGNOSIS — F31.81 BIPOLAR 2 DISORDER (HCC): ICD-10-CM

## 2023-01-11 DIAGNOSIS — I10 PRIMARY HYPERTENSION: ICD-10-CM

## 2023-01-11 DIAGNOSIS — R20.2 NUMBNESS AND TINGLING OF BOTH FEET: ICD-10-CM

## 2023-01-11 DIAGNOSIS — Z00.00 PREVENTATIVE HEALTH CARE: ICD-10-CM

## 2023-01-11 DIAGNOSIS — N52.9 ERECTILE DYSFUNCTION, UNSPECIFIED ERECTILE DYSFUNCTION TYPE: ICD-10-CM

## 2023-01-11 PROBLEM — E66.9 OBESITY (BMI 30-39.9): Status: ACTIVE | Noted: 2023-01-11

## 2023-01-11 PROCEDURE — 99214 OFFICE O/P EST MOD 30 MIN: CPT | Mod: GC

## 2023-01-11 RX ORDER — QUETIAPINE FUMARATE 300 MG/1
300 TABLET, FILM COATED ORAL EVERY EVENING
Qty: 90 TABLET | Refills: 4 | Status: SHIPPED | OUTPATIENT
Start: 2023-01-11 | End: 2023-06-27 | Stop reason: SDUPTHER

## 2023-01-11 RX ORDER — TADALAFIL 10 MG/1
20 TABLET ORAL
Qty: 30 TABLET | Refills: 11 | Status: SHIPPED | OUTPATIENT
Start: 2023-01-11 | End: 2023-06-27 | Stop reason: SDUPTHER

## 2023-01-11 RX ORDER — LISINOPRIL AND HYDROCHLOROTHIAZIDE 12.5; 1 MG/1; MG/1
1 TABLET ORAL DAILY
Qty: 90 TABLET | Refills: 4 | Status: SHIPPED | OUTPATIENT
Start: 2023-01-11 | End: 2023-06-27 | Stop reason: SDUPTHER

## 2023-01-11 NOTE — PATIENT INSTRUCTIONS
For your sleep study please call:   [facility]  [address]  [place] [address]  Phone: [phone]    Please follow-up in 3 months   Please follow-up on referrals and schedule an appointment with sleep medicine.   Please get lab work done at least 5 days before next visit.  Please try and eat healthy, get at least 30 minutes of cardiovascular exercise a day to help keep your health as best as it can be.  If you have any questions or concerns please feel free to contact us at [phone].  If you feel like you are experiencing a medical emergency please seek immediate medical attention at an urgent care or in the emergency department.

## 2023-01-11 NOTE — PROGRESS NOTES
Established Patient    Rojas Love is a 57 y.o. male who presents today with the following Chief Complaint(s): Follow up for Diagnoses of Erectile dysfunction, unspecified erectile dysfunction type, Numbness and tingling of both feet, Preventative health care, Primary hypertension, and Bipolar 2 disorder (HCC) were pertinent to this visit.    HPI:   Erectile Dysfunction:   He has had trouble with ED for about 10 years. He said he has had some improvement with Cialis but is still not as hard as he used to be. His relationship with his wife is good. He forgot to complete his labs. Discussed smoking and weight loss.   -Diet: He said his diet could be better. He eats out a lot because it is convenient. However, he has been eating out less over the past few weeks because he has been working less.   -Exercise: He is usually fairly physically active at work and has been more active lately due to having to shovel snow. He said he is able to shovel about 1/2 hr before taking a break.     Numbness/tingling of toes bilaterally:   He said he has had some mild numbness/tingling of his toes and ball of his foot bilaterally with R>L for past couple months. He said his shoes fit well. No inciting event. Not alleviated or exacerbated by anything, just a constant mild numbness/tingling.     Primary Hypertension:   Does not currently regularly check his BP at home, when he did it was usually around 127/70s. Currently stable, just needed refills today.     Bipolar 2 Disorder:   Stable on current medication, just needed refills today.     Screening Exams:   -Colonoscopy- Had cologuard about 3.5 years ago. Ordered repeat on 1/11/23.   -HIV, Syphilis, Chlamydia, and Gonorrhea- had previously, declined  repeat testing.   -Has about 20 pk/yr hx.- Will order CT Thorax and Abd US at upcoming appointment.   Vaccines:   -Influenza vaccination: got about 2 months ago.   -Tdap/TD: Unknown when had last vaccine, thinks he is up to  "date.   -Zoster vaccine: did not have chicken pox as a child.   -COVID: has had 4 shots.     History reviewed. No pertinent past medical history.  Social History     Tobacco Use    Smoking status: Every Day     Packs/day: 1.00     Types: Cigarettes    Smokeless tobacco: Never   Vaping Use    Vaping Use: Never used   Substance Use Topics    Alcohol use: Not Currently     Comment: occational until around 7/2022 he quit to support his wife quitting.    Drug use: Never     Current Outpatient Medications   Medication Sig Dispense Refill    quetiapine (SEROQUEL) 300 MG tablet Take 1 Tablet by mouth every evening. 90 90 Tablet 4    lisinopril-hydrochlorothiazide (PRINZIDE) 10-12.5 MG per tablet Take 1 Tablet by mouth every day. 90 Tablet 4    tadalafil (CIALIS) 10 MG tablet Take 2 Tablets by mouth 1 time a day as needed for Erectile Dysfunction. 30 Tablet 11     No current facility-administered medications for this visit.       ROS: As per HPI. Additional pertinent systems as noted below.  Constitutional: Negative for chills and fever.   HENT: Negative for ear pain and sore throat.    Eyes: Negative for discharge and redness.   Respiratory: Negative for cough, hemoptysis, wheezing and stridor.    Cardiovascular: Negative for chest pain, palpitations and leg swelling.   Gastrointestinal: Negative for abdominal pain, constipation, diarrhea, heartburn, nausea and vomiting.   Genitourinary: Negative for dysuria, flank pain and hematuria.   Musculoskeletal: Negative for falls and myalgias.   Skin: Negative for itching and rash.   Neurological: Negative for dizziness, seizures, loss of consciousness and headaches. Positive for numbness/tingling of toes bilaterally.     Physical Exam  /77 (BP Location: Left arm, Patient Position: Sitting, BP Cuff Size: Large adult)   Pulse 84   Temp 36.8 °C (98.3 °F) (Temporal)   Ht 1.88 m (6' 2\")   Wt (!) 131 kg (289 lb)   SpO2 94%   BMI 37.11 kg/m²   General:  Alert and oriented, No " apparent distress.    HEENT: Normocephalic, atraumatic. No scleral icterus. Mucous membranes clear and moist, no erythema or exudates noted.    Neck: Supple. No lymphadenopathy noted.    Lungs: Clear to auscultation. No wheezes, rales, or rhonchi.     Cardiovascular: Regular rate and rhythm. No murmurs, rubs or gallops.    Abdomen:  Regular bowel sounds, nontender, no rebound or guarding noted.    Extremities: No clubbing, cyanosis, edema. Varicose veins present ant tib bilat with L>R.     Skin: Clear. No rashes or bruising.       Assessment and Plan:   1. Erectile dysfunction, unspecified erectile dysfunction type  Ddx likely multifactorial due to smoking and weight. Possible low testosterone, high cholesterol, DM, or sleep disorder.   -Some improvement with tadalafil He said it is working better than sildenafil did.   Plan:   -Reiterated importance of lab-work (previously ordered testosterone, lipid panel, A1C)  -Provided sleep medicine referral information.   -Discussed smoking cessation and weight loss. Recommended eating out less frequently, decreasing salt intake, and increase fruit and vegetable intake.   -Refilled tadalafil (CIALIS) 10 MG tablet; Take 2 Tablets by mouth 1 time a day as needed for Erectile Dysfunction.  Dispense: 30 Tablet; Refill: 11    2. Numbness and tingling of both feet  Ddx peripheral neuropathy (hypoTH, B12 defic, folate defic, vascular, vs weight or poor fitting shoes) vs diabetic neuropathy.   Plan:   -Re-printed lab orders for A1C, Lipid Panel   -VITAMIN B12; Future  -FOLATE; Future  -TSH+FREE T4    3. Preventative health care  -Colonoscopy- Had cologuard about 3.5 years ago. Ordered repeat on 1/11/23.   -HIV, Syphilis, Chlamydia, and Gonorrhea- had previously, declined  repeat testing.   -Has about 20 pk/yr hx.- Will order CT Thorax and Abd US at upcoming appointment.   -Influenza vaccination: got about 2 months ago.   -Tdap/TD: Unknown when had last vaccine, thinks he is up to  date.   -Zoster vaccine: did not have chicken pox as a child.   -COVID: has had 4 shots.   Plan:   -Has about 20 pk/yr hx.- Will order CT Thorax and Abd US at upcoming appointment.   -Ordered COLOGUARD (FIT DNA)    4. Primary hypertension  -Stable on current medication. Home BP 120s/70s.   -127/77 in clinic today. Repeat was 122/78.   Plan:   -Refilled lisinopril-hydrochlorothiazide (PRINZIDE) 10-12.5 MG per tablet; Take 1 Tablet by mouth every day.  Dispense: 90 Tablet; Refill: 4    5. Bipolar 2 disorder (HCC)  -Dxed in 2002, has been on Quetiapine 300mg since then. Stable on current medication.   Plan:   -Refilled quetiapine (SEROQUEL) 300 MG tablet; Take 1 Tablet by mouth every evening. 90  Dispense: 90 Tablet; Refill: 4      Follow up: 3  months   F/U on:   -Order CT Thorax   -Cologbernadette   -Sleep medicine   -Labs     Merlin Zazueta D.O. PGY I  [facility]

## 2023-03-31 LAB — HBA1C MFR BLD: 6.1 % (ref ?–5.8)

## 2023-04-03 ENCOUNTER — OFFICE VISIT (OUTPATIENT)
Dept: INTERNAL MEDICINE | Facility: OTHER | Age: 58
End: 2023-04-03
Payer: MEDICARE

## 2023-04-03 ENCOUNTER — HOSPITAL ENCOUNTER (OUTPATIENT)
Facility: MEDICAL CENTER | Age: 58
End: 2023-04-03
Payer: MEDICARE

## 2023-04-03 VITALS
DIASTOLIC BLOOD PRESSURE: 74 MMHG | SYSTOLIC BLOOD PRESSURE: 118 MMHG | HEART RATE: 90 BPM | WEIGHT: 301 LBS | OXYGEN SATURATION: 94 % | HEIGHT: 73 IN | TEMPERATURE: 98.5 F | BODY MASS INDEX: 39.89 KG/M2

## 2023-04-03 DIAGNOSIS — J06.9 UPPER RESPIRATORY TRACT INFECTION, UNSPECIFIED TYPE: ICD-10-CM

## 2023-04-03 DIAGNOSIS — E78.5 HYPERLIPIDEMIA, UNSPECIFIED HYPERLIPIDEMIA TYPE: ICD-10-CM

## 2023-04-03 DIAGNOSIS — N52.9 ERECTILE DYSFUNCTION, UNSPECIFIED ERECTILE DYSFUNCTION TYPE: ICD-10-CM

## 2023-04-03 DIAGNOSIS — E66.09 CLASS 2 OBESITY DUE TO EXCESS CALORIES WITH BODY MASS INDEX (BMI) OF 39.0 TO 39.9 IN ADULT, UNSPECIFIED WHETHER SERIOUS COMORBIDITY PRESENT: ICD-10-CM

## 2023-04-03 DIAGNOSIS — R73.03 PREDIABETES: ICD-10-CM

## 2023-04-03 PROCEDURE — U0005 INFEC AGEN DETEC AMPLI PROBE: HCPCS

## 2023-04-03 PROCEDURE — U0003 INFECTIOUS AGENT DETECTION BY NUCLEIC ACID (DNA OR RNA); SEVERE ACUTE RESPIRATORY SYNDROME CORONAVIRUS 2 (SARS-COV-2) (CORONAVIRUS DISEASE [COVID-19]), AMPLIFIED PROBE TECHNIQUE, MAKING USE OF HIGH THROUGHPUT TECHNOLOGIES AS DESCRIBED BY CMS-2020-01-R: HCPCS

## 2023-04-03 PROCEDURE — 99214 OFFICE O/P EST MOD 30 MIN: CPT | Mod: GC

## 2023-04-03 RX ORDER — ATORVASTATIN CALCIUM 40 MG/1
40 TABLET, FILM COATED ORAL NIGHTLY
Qty: 90 TABLET | Refills: 3 | Status: SHIPPED | OUTPATIENT
Start: 2023-04-03 | End: 2023-06-27 | Stop reason: SDUPTHER

## 2023-04-03 NOTE — PROGRESS NOTES
Teaching Physician Attestation      Level of Participation    I have personally interviewed and examined the patient.  In addition, I discussed with the resident physician the patient's history, exam, assessment and plan in detail.  Topics listed in my addendum were the focus of the visit.  Healthcare maintenance was not addressed this visit unless listed as a topic in my addendum.  I agree with the plan as written along with the following additions/modifications:      Prediabetes, HTN, elevated ldl in setting of morbid obesity  -healthy eating handout given  -ascvd 18%, given multiple risk factors including morbid obesity, smoking, prediabetes, hypertension, significantly elevated LDL, will begin high intensity atorvastatin 40 mg.  LFTs normal 3/31.  Check lipids in 2-3 months.  -bp at goal today, no symptoms, continue lisinopril/HCTZ.  CMP recently within normal limits, no prior CMP for comparison.  -elevated TG, encouraged diet/exercise as aboe      Mildy leukocytosis in setting of likely viral uri  -2-3 days of congestion and cough, no fevers, chills, vomiting, chest pain, shortness of breath, nontoxic, clear lungs, saturating normally, states he feels as if he is improving.  Given he is incompletely immunized for COVID, is morbidly obese, is a smoker, and is over the age of 50, will swab for COVID and treat with Paxlovid if positive.  If negative supportive care.        Erythrocytosis  -repeating cbc to trend after resolution of sx as above.  Could be related to tobacco use.      History of erectile dysfunction  -Not discussed today, but did follow-up on mildly low testosterone, explained the pathophysiology of morbid obesity likely contributing to this.  Also, will require cautious monitoring for cardiovascular disease given risk factors, although patient currently denies chest pain shortness of breath.      Hx peripheral paresthesias not fully addressed, but b12 nl from prior labs.  Will need dedicted f/u for  this.    Counseled any chest pain or shortness of breath lasting more than 5 minutes patient should go to the emergency room.    Return to clinic 6-12 weeks.

## 2023-04-03 NOTE — PROGRESS NOTES
Established Patient    Rojas Love is a 57 y.o. male who presents today with the following Chief Complaint(s): Follow up for Diagnoses of Class 2 obesity , Hyperlipidemia, unspecified hyperlipidemia type, Prediabetes, Erectile dysfunction, unspecified erectile dysfunction type, and Upper respiratory tract infection, unspecified type were pertinent to this visit.    HPI:  Class II Obesity:   Pt has increased weight since previous appointment and expressed concern regarding recent lab work. He said that he has been slowly trying to improve his diet and exercise. He has started eating more chinese food (to increase his vegetabel intake) and has stopped drinking soda. He has quit drinking soda before but feels like he has a habit of purchasing soda at the [employer] and reported that yesterday he had to stop himself from buying soda. He also said he has been trying to go on walks with his wife but wants to be better. He is usually fairly physically active at work and has been more active lately due to having to shovel snow and said he is able to shovel about 1/2 hr before taking a break.     Erectile Dysfunction:   No change in sx from previous visit.     HTN:   If he forgets to take his medications his BP increases and he will get headaches and SBP will increase to 160s. However, when he remembers to take his medications his BP is 120s/80s. No sx of light-headed feeling or dizziness.     Screening:   -Colonoscopy- Had cologuard about 3.5 years ago. Ordered repeat on 1/11/23.   -HIV, Syphilis, Chlamydia, and Gonorrhea- had previously, negative.   -Has about 20 pk/yr hx.- Will order CT Thorax and Abd US at upcoming appointment.   -Influenza vaccination: 11/2022  -Tdap/TD: thinks he is up to date.   -Zoster vaccine: did not have chicken pox as a child.   -COVID: has had 4 shots.     No past medical history on file.  Social History     Tobacco Use    Smoking status: Every Day     Packs/day: 1.00     Types:  "Cigarettes    Smokeless tobacco: Never   Vaping Use    Vaping Use: Never used   Substance Use Topics    Alcohol use: Not Currently     Comment: occational until around 7/2022 he quit to support his wife quitting.    Drug use: Never     Current Outpatient Medications   Medication Sig Dispense Refill    atorvastatin (LIPITOR) 40 MG Tab Take 1 Tablet by mouth every evening. 90 Tablet 3    quetiapine (SEROQUEL) 300 MG tablet Take 1 Tablet by mouth every evening. 90 90 Tablet 4    lisinopril-hydrochlorothiazide (PRINZIDE) 10-12.5 MG per tablet Take 1 Tablet by mouth every day. 90 Tablet 4    tadalafil (CIALIS) 10 MG tablet Take 2 Tablets by mouth 1 time a day as needed for Erectile Dysfunction. 30 Tablet 11     No current facility-administered medications for this visit.       ROS: As per HPI. Additional pertinent systems as noted below.  Constitutional: Negative for chills and fever.   HENT: Negative for ear pain and sore throat. Positive for occasional runny nose.   Respiratory: Negative for cough, hemoptysis, wheezing and stridor.    Cardiovascular: Negative for chest pain, palpitations and leg swelling.   Gastrointestinal: Negative for abdominal pain, constipation, diarrhea, heartburn, nausea and vomiting.   Genitourinary: Negative for dysuria, flank pain and hematuria.   Musculoskeletal: Negative for falls and myalgias.   Skin: Negative for itching and rash.   Neurological: Negative for dizziness, seizures, loss of consciousness and headaches.     Physical Exam  /74 (BP Location: Left arm, Patient Position: Sitting, BP Cuff Size: Large adult)   Pulse 90   Temp 36.9 °C (98.5 °F) (Temporal)   Ht 1.854 m (6' 1\")   Wt (!) 137 kg (301 lb)   SpO2 94%   BMI 39.71 kg/m²   General:  Alert and oriented, No apparent distress.    HEENT: Normocephalic, atraumatic. No scleral icterus. Mucous membranes clear and moist. No erythema or exudates noted.    Neck: Supple. No lymphadenopathy noted. Thyroid not " enlarged.    Lungs: Clear to auscultation. No wheezes, rales, or rhonchi.     Cardiovascular: Regular rate and rhythm. No murmurs, rubs or gallops.    Abdomen:  Regular bowel sounds, nontender, no rebound or guarding noted.    Extremities: No clubbing, cyanosis, edema. Bilat lower extremities have small varicose veins.     Neuro: Aox4, 5/5 strength bilat upper and lower extremities.       Assessment and Plan:   1. Class 2 obesity   2. Hyperlipidemia, unspecified hyperlipidemia type  3. Prediabetes  -Recent labs notable for A1C 6.1, Chol 206, HDL 33, and Tri 490. He meets Dx criteria for prediabetes and hyperlipidemia.   -ASCVD score of 18% due to multiple risk factors or obesity, smoking, htn, and elevated LDL.   Plan:   -discussed ASCVD results and prescribed atorvastatin (LIPITOR) 40 MG Tab; Take 1 Tablet by mouth every evening.  Dispense: 90 Tablet; Refill: 3  -discussed diet/exercise and provided handout with dietary recommendations.   -currently he is working on quitting soda and has successfully gone 2 days without any soda. Encouraged pt to continue making small changes.   -Pt also reported increasing amount of vegetables in his diet and said that he is trying to eat more chinese food. Discussed and recommended mediterranean diet.     4. Erectile dysfunction, unspecified erectile dysfunction type  Ddx likely multifactorial due to smoking and weight. Possible low testosterone, high cholesterol, DM, or sleep disorder.   -Some improvement with tadalafil; reported that it is working better than sildenafil.   -Recent labs notable for low testosterone   Plan:   -Explained how weight can increase estrogen and decrease testosterone.   -Discussed smoking cessation and weight loss (see plan for Class II Obesity)     5. Upper respiratory tract infection, unspecified type  -Recent labs notable for elevated WBCs, likely secondary to URI. Reported 2-3 days contestion. No fevers, chills, shortness of breath. (see full ROS  above).   Plan:   - SARS-CoV-2, PCR (In-House); Future  -Given that he is high risk for complications of COVID if COVID positive, ordered COVID test and will prescribe Paxlovid if positive.       Follow up: 3 months     Merlin Zazueta D.O. PGY I  [facility]

## 2023-04-03 NOTE — PATIENT INSTRUCTIONS
Thank you for visiting today!  Please follow-up in 3 months.   Please try and eat healthy, get at least 30 minutes of cardiovascular exercise a day to help keep your health as best as it can be. Keep working on quitting soda, you can do it!     If you have any questions or concerns please feel free to contact us at [phone].  If you feel like you are experiencing a medical emergency please seek immediate medical attention at an urgent care or in the emergency department.

## 2023-04-04 DIAGNOSIS — J06.9 UPPER RESPIRATORY TRACT INFECTION, UNSPECIFIED TYPE: ICD-10-CM

## 2023-04-05 LAB
SARS-COV-2 RNA RESP QL NAA+PROBE: NOTDETECTED
SPECIMEN SOURCE: NORMAL

## 2023-06-27 ENCOUNTER — OFFICE VISIT (OUTPATIENT)
Dept: INTERNAL MEDICINE | Facility: OTHER | Age: 58
End: 2023-06-27
Payer: MEDICARE

## 2023-06-27 VITALS
SYSTOLIC BLOOD PRESSURE: 135 MMHG | OXYGEN SATURATION: 93 % | HEIGHT: 74 IN | HEART RATE: 87 BPM | BODY MASS INDEX: 37.37 KG/M2 | WEIGHT: 291.2 LBS | DIASTOLIC BLOOD PRESSURE: 66 MMHG | TEMPERATURE: 97.8 F

## 2023-06-27 DIAGNOSIS — I10 PRIMARY HYPERTENSION: ICD-10-CM

## 2023-06-27 DIAGNOSIS — E78.5 HYPERLIPIDEMIA, UNSPECIFIED HYPERLIPIDEMIA TYPE: ICD-10-CM

## 2023-06-27 DIAGNOSIS — N52.9 ERECTILE DYSFUNCTION, UNSPECIFIED ERECTILE DYSFUNCTION TYPE: ICD-10-CM

## 2023-06-27 DIAGNOSIS — F31.81 BIPOLAR 2 DISORDER (HCC): ICD-10-CM

## 2023-06-27 DIAGNOSIS — E66.09 CLASS 2 OBESITY DUE TO EXCESS CALORIES WITHOUT SERIOUS COMORBIDITY WITH BODY MASS INDEX (BMI) OF 37.0 TO 37.9 IN ADULT: ICD-10-CM

## 2023-06-27 DIAGNOSIS — Z71.6 ENCOUNTER FOR SMOKING CESSATION COUNSELING: ICD-10-CM

## 2023-06-27 PROCEDURE — 3078F DIAST BP <80 MM HG: CPT

## 2023-06-27 PROCEDURE — 99213 OFFICE O/P EST LOW 20 MIN: CPT | Mod: GE

## 2023-06-27 PROCEDURE — 3075F SYST BP GE 130 - 139MM HG: CPT

## 2023-06-27 RX ORDER — ATORVASTATIN CALCIUM 40 MG/1
40 TABLET, FILM COATED ORAL NIGHTLY
Qty: 90 TABLET | Refills: 3 | Status: SHIPPED | OUTPATIENT
Start: 2023-06-27 | End: 2023-09-19

## 2023-06-27 RX ORDER — TADALAFIL 10 MG/1
20 TABLET ORAL
Qty: 30 TABLET | Refills: 11 | Status: SHIPPED | OUTPATIENT
Start: 2023-06-27 | End: 2023-12-06 | Stop reason: SDUPTHER

## 2023-06-27 RX ORDER — LISINOPRIL AND HYDROCHLOROTHIAZIDE 12.5; 1 MG/1; MG/1
1 TABLET ORAL DAILY
Qty: 90 TABLET | Refills: 3 | Status: SHIPPED | OUTPATIENT
Start: 2023-06-27 | End: 2023-12-06 | Stop reason: SDUPTHER

## 2023-06-27 RX ORDER — QUETIAPINE FUMARATE 300 MG/1
300 TABLET, FILM COATED ORAL EVERY EVENING
Qty: 90 TABLET | Refills: 3 | Status: SHIPPED | OUTPATIENT
Start: 2023-06-27 | End: 2023-12-06 | Stop reason: SDUPTHER

## 2023-06-27 NOTE — PATIENT INSTRUCTIONS
Thank you for visiting today!  Please follow-up in 3 months     Please try and eat healthy, get at least 30 minutes of cardiovascular exercise a day to help keep your health as best as it can be.  If you have any questions or concerns please feel free to contact us at [phone].  If you feel like you are experiencing a medical emergency please seek immediate medical attention at an urgent care or in the emergency department.

## 2023-06-27 NOTE — PROGRESS NOTES
Established Patient    Rojas Love is a 57 y.o. male who presents today with the following Chief Complaint(s): Follow up for Diagnoses of Class 2 obesity , Encounter for smoking cessation counseling, Bipolar 2 disorder (HCC), Primary hypertension, Hyperlipidemia, unspecified hyperlipidemia type, and Erectile dysfunction, unspecified erectile dysfunction type were pertinent to this visit.    HPI:  Class II obesity:   This summer pt said he started working at [employer] again and has been physically active at work. He also said he had been working on his diet and reported that he hadn't had any soda for about 2 months. He also reported working to increase fruit/vegetable intake.     Smoking:   Pt has 20 pk yr hx (1 pk/day for 20+ yrs) but is interested in improving his health. At prior appointments had discussed cutting back. He said that he has been unable to cut back and smokes partially out of habit when he is irritated or bored. He did mention that he had used nicotine gum before and had been able to go on an international flight without smoking and he expressed interest in trying Nicotine gum before. He said that he hadn't been using it due to cost but said if insurance would cover it he was interested in using it to try to cut back on smoking. His wife does not smoke and is supportive of him quitting smoking.     Bipolar 2 Disorder:   Stable on current medication, just needed refills today.     Primary Hypertension:   Does not currently regularly check his BP at home, when he did it was usually around 127/70s. Currently stable, just needed refills today.     HLD:   Has been taking Atorvastatin 40mg daily without any side-effects of muscle aches/cramps.     Erectile Dysfunction:   Tadalafil working. Hopeful for improvement with weight loss and smoking cessation.       Screening Exams:   -Colonoscopy- Had cologuard about 3.5 years ago. Ordered repeat on 1/11/23.   -HIV, Syphilis, Chlamydia, and Gonorrhea-  had previously, declined  repeat testing.   -Has about 20 pk/yr hx.- order CT Thorax and Abd US at upcoming appointment.   Vaccines:   -Influenza vaccination: got about 2 months ago.   -Tdap/TD: Unknown when had last vaccine, thinks he is up to date.   -Zoster vaccine: did not have chicken pox as a child.   -COVID: has had 4 shots.       No past medical history on file.  Social History     Tobacco Use    Smoking status: Every Day     Packs/day: 1.00     Types: Cigarettes    Smokeless tobacco: Never   Vaping Use    Vaping Use: Never used   Substance Use Topics    Alcohol use: Not Currently     Comment: occational until around 7/2022 he quit to support his wife quitting.    Drug use: Never     Current Outpatient Medications   Medication Sig Dispense Refill    quetiapine (SEROQUEL) 300 MG tablet Take 1 Tablet by mouth every evening. 90 90 Tablet 3    lisinopril-hydrochlorothiazide (PRINZIDE) 10-12.5 MG per tablet Take 1 Tablet by mouth every day. 90 Tablet 3    atorvastatin (LIPITOR) 40 MG Tab Take 1 Tablet by mouth every evening. 90 Tablet 3    tadalafil (CIALIS) 10 MG tablet Take 2 Tablets by mouth 1 time a day as needed for Erectile Dysfunction. 30 Tablet 11    nicotine polacrilex (NICORETTE) 2 MG Gum Take 1 Each by mouth as needed for Smoking Cessation. 220 Each 11     No current facility-administered medications for this visit.       ROS: As per HPI. Additional pertinent systems as noted below.  Constitutional: Negative for chills and fever.   Respiratory: Negative for cough, wheezing and shortness of breath.    Cardiovascular: Negative for chest pain, palpitations and leg swelling.   Gastrointestinal: Negative for abdominal pain, constipation, diarrhea, heartburn, nausea and vomiting.   Genitourinary: Negative for dysuria, flank pain and hematuria.   Musculoskeletal: Negative for falls and myalgias.   Skin: Negative for itching and rash.   Neurological: Negative for dizziness, seizures, loss of consciousness and  "headaches.     Physical Exam  /66 (BP Location: Left arm, Patient Position: Sitting, BP Cuff Size: Adult)   Pulse 87   Temp 36.6 °C (97.8 °F) (Temporal)   Ht 1.88 m (6' 2\")   Wt (!) 132 kg (291 lb 3.2 oz)   SpO2 93%   BMI 37.39 kg/m²    General:  Alert and oriented, No apparent distress.    HEENT: Normocephalic, atraumatic. No scleral icterus. Mucous membranes clear and moist without erythema or exudates.     Lungs: Clear to auscultation. No wheezes, rales, or rhonchi.     Cardiovascular: Regular rate and rhythm. No murmurs, rubs or gallops.    Abdomen:  Regular bowel sounds, nontender, no rebound or guarding noted.    Extremities: No clubbing, cyanosis, edema.    Skin: Clear. No rash or suspicious skin lesions noted.    Neuro: Aox4, strength 5/5 bilat upper and lower extremities.       Assessment and Plan:   1. Class 2 obesity   -Pt has lost 10 pounds in past 2 months.   Plan:   -currently he is working on quitting soda and went about 2 months without any soda. Encouraged pt to continue making small changes.   -Pt also reported increasing amount of vegetables in his diet and said that he is trying to eat more chinese food. Discussed and recommended mediterranean diet, low carbohydrates, low salt.     2. Encounter for smoking cessation counseling  -Patient endorses a current smoking of 1 pk/day, and has a 20 pack-year history of smoking  -Patient's last CT surveillance:  -Patient ready to quit: Yes  -Triggers: feeling bored or irritated    -Other smokers in household: no  Plan:   -Utilized the 5 A strategy to identify and assist patient willing to quit, counseled patient's for greater than 3 minutes on benefits of smoking cessation health and financial, and the available methodologies including a multidisciplinary approach to pharmacotherapy, nicotine replacement, counseling, telemetry quit resources, and social support  -Prescribed nicotine polacrilex (NICORETTE) 2 MG Gum; Take 1 Each by mouth as needed " for Smoking Cessation.  Dispense: 220 Each; Refill: 11    3. Bipolar 2 disorder (HCC)  -Dxed in 2002, has been on Quetiapine 300mg since then. Stable on current medication.   Plan:   -Refilled quetiapine (SEROQUEL) 300 MG tablet; Take 1 Tablet by mouth every evening. 90  Dispense: 90 Tablet; Refill: 3    4. Primary hypertension  BP is 130s/70s in the clinic today. The patient denies any episodes of CP, palpitations, SOB, lightheadedness/dizziness, or blurred vision.  Plan:   -Advised to monitor and record pressures 3x/week   -Advised goal is 120s/70s.   -Risks associated with uncontrolled htn discussed.   -Discussed Lifestyle factors to help manage blood pressure:  1) reduce stress with daily activity, consider yoga, breathing exercises (like 4-7-8 breathing technique)   2) reduce sodium to <2000 mg/day  3) DASH diet     4) 30 minutes of exercise each day   -Refilled lisinopril-hydrochlorothiazide (PRINZIDE) 10-12.5 MG per tablet; Take 1 Tablet by mouth every day.  Dispense: 90 Tablet; Refill: 3    5. Hyperlipidemia, unspecified hyperlipidemia type  -Recent labs notable for A1C 6.1, Chol 206, HDL 33, and Tri 490. He meets Dx criteria for prediabetes and hyperlipidemia.   -ASCVD score of 18% due to multiple risk factors or obesity, smoking, htn, and elevated LDL.  Plan:   -Refilled atorvastatin (LIPITOR) 40 MG Tab; Take 1 Tablet by mouth every evening.  Dispense: 90 Tablet; Refill: 3    6. Erectile dysfunction, unspecified erectile dysfunction type  Ddx likely multifactorial due to smoking and weight. Possible low testosterone, high cholesterol, DM, or sleep disorder.   -Some improvement with tadalafil; reported that it is working better than sildenafil.   Plan:   -Explained how weight can increase estrogen and decrease testosterone.   -Discussed smoking cessation and weight loss (see plan for Class II Obesity)   -Refilled tadalafil (CIALIS) 10 MG tablet; Take 2 Tablets by mouth 1 time a day as needed for Erectile  Dysfunction.  Dispense: 30 Tablet; Refill: 11    Follow up: 3 months     Merlin Zazueta D.O. PGY I  [facility]

## 2023-09-19 ENCOUNTER — OFFICE VISIT (OUTPATIENT)
Dept: INTERNAL MEDICINE | Facility: OTHER | Age: 58
End: 2023-09-19
Payer: MEDICARE

## 2023-09-19 VITALS
OXYGEN SATURATION: 97 % | WEIGHT: 284 LBS | SYSTOLIC BLOOD PRESSURE: 133 MMHG | TEMPERATURE: 98.1 F | HEART RATE: 86 BPM | DIASTOLIC BLOOD PRESSURE: 69 MMHG | HEIGHT: 74 IN | BODY MASS INDEX: 36.45 KG/M2

## 2023-09-19 DIAGNOSIS — Z12.11 COLON CANCER SCREENING: ICD-10-CM

## 2023-09-19 DIAGNOSIS — Z20.822 EXPOSURE TO COVID-19 VIRUS: ICD-10-CM

## 2023-09-19 DIAGNOSIS — Z23 ENCOUNTER FOR ADMINISTRATION OF VACCINE: ICD-10-CM

## 2023-09-19 DIAGNOSIS — Z87.891 PERSONAL HISTORY OF NICOTINE DEPENDENCE: ICD-10-CM

## 2023-09-19 DIAGNOSIS — E78.5 HYPERLIPIDEMIA, UNSPECIFIED HYPERLIPIDEMIA TYPE: ICD-10-CM

## 2023-09-19 DIAGNOSIS — Z71.6 ENCOUNTER FOR SMOKING CESSATION COUNSELING: ICD-10-CM

## 2023-09-19 LAB
FLUAV RNA SPEC QL NAA+PROBE: NEGATIVE
FLUBV RNA SPEC QL NAA+PROBE: NEGATIVE
RSV RNA SPEC QL NAA+PROBE: NEGATIVE
SARS-COV-2 RNA RESP QL NAA+PROBE: NEGATIVE

## 2023-09-19 PROCEDURE — 3078F DIAST BP <80 MM HG: CPT

## 2023-09-19 PROCEDURE — 0241U POCT CEPHEID COV-2, FLU A/B, RSV - PCR: CPT

## 2023-09-19 PROCEDURE — 99214 OFFICE O/P EST MOD 30 MIN: CPT | Mod: 25,GC

## 2023-09-19 PROCEDURE — G0008 ADMIN INFLUENZA VIRUS VAC: HCPCS

## 2023-09-19 PROCEDURE — 3075F SYST BP GE 130 - 139MM HG: CPT

## 2023-09-19 PROCEDURE — 90686 IIV4 VACC NO PRSV 0.5 ML IM: CPT

## 2023-09-19 RX ORDER — BUPROPION HYDROCHLORIDE 150 MG/1
150 TABLET ORAL 2 TIMES DAILY
Qty: 180 TABLET | Refills: 3 | Status: SHIPPED | OUTPATIENT
Start: 2023-09-19 | End: 2023-12-06

## 2023-09-19 RX ORDER — ATORVASTATIN CALCIUM 20 MG/1
20 TABLET, FILM COATED ORAL NIGHTLY
Qty: 90 TABLET | Refills: 3 | Status: SHIPPED | OUTPATIENT
Start: 2023-09-19 | End: 2023-12-06 | Stop reason: SDUPTHER

## 2023-09-19 NOTE — PATIENT INSTRUCTIONS
At today's visit I decreased your Atorvastatin to 20mg, please let me know if it is still causing you any problems.   We also started Bupropion 150mg. Please take one pill daily for the first 3 days and then increase to twice daily. This is to help with smoking cessation.     I ordered three screening tests for you:  -abdominal ultrasound to check your abdominal aorta   -CT of your chest to screen for lung cancer   -Cologuard to screen for colon cancer

## 2023-12-06 ENCOUNTER — OFFICE VISIT (OUTPATIENT)
Dept: INTERNAL MEDICINE | Facility: OTHER | Age: 58
End: 2023-12-06
Payer: MEDICARE

## 2023-12-06 VITALS
SYSTOLIC BLOOD PRESSURE: 125 MMHG | DIASTOLIC BLOOD PRESSURE: 72 MMHG | HEART RATE: 95 BPM | BODY MASS INDEX: 36.63 KG/M2 | TEMPERATURE: 98.2 F | OXYGEN SATURATION: 93 % | HEIGHT: 74 IN | WEIGHT: 285.4 LBS

## 2023-12-06 DIAGNOSIS — I10 PRIMARY HYPERTENSION: ICD-10-CM

## 2023-12-06 DIAGNOSIS — F31.81 BIPOLAR 2 DISORDER (HCC): ICD-10-CM

## 2023-12-06 DIAGNOSIS — E78.5 HYPERLIPIDEMIA, UNSPECIFIED HYPERLIPIDEMIA TYPE: ICD-10-CM

## 2023-12-06 DIAGNOSIS — N52.9 ERECTILE DYSFUNCTION, UNSPECIFIED ERECTILE DYSFUNCTION TYPE: ICD-10-CM

## 2023-12-06 DIAGNOSIS — Z12.11 SCREENING FOR COLON CANCER: ICD-10-CM

## 2023-12-06 PROCEDURE — 99214 OFFICE O/P EST MOD 30 MIN: CPT | Mod: GC

## 2023-12-06 PROCEDURE — 99999 PR NO CHARGE: CPT | Performed by: INTERNAL MEDICINE

## 2023-12-06 RX ORDER — LISINOPRIL AND HYDROCHLOROTHIAZIDE 12.5; 1 MG/1; MG/1
1 TABLET ORAL DAILY
Qty: 90 TABLET | Refills: 3 | Status: SHIPPED | OUTPATIENT
Start: 2023-12-06

## 2023-12-06 RX ORDER — ATORVASTATIN CALCIUM 20 MG/1
20 TABLET, FILM COATED ORAL NIGHTLY
Qty: 90 TABLET | Refills: 3 | Status: SHIPPED | OUTPATIENT
Start: 2023-12-06

## 2023-12-06 RX ORDER — TADALAFIL 10 MG/1
20 TABLET ORAL
Qty: 30 TABLET | Refills: 11 | Status: SHIPPED | OUTPATIENT
Start: 2023-12-06

## 2023-12-06 RX ORDER — QUETIAPINE FUMARATE 300 MG/1
300 TABLET, FILM COATED ORAL EVERY EVENING
Qty: 90 TABLET | Refills: 3 | Status: SHIPPED | OUTPATIENT
Start: 2023-12-06

## 2023-12-06 ASSESSMENT — FIBROSIS 4 INDEX: FIB4 SCORE: 0.74

## 2023-12-06 NOTE — PROGRESS NOTES
Established Patient    Rojas Love is a 57 y.o. male who presents today with the following Chief Complaint(s): Follow up for There were no encounter diagnoses.    HPI:  Hyperlipidemia:   At prior appointment Noted that since starting Atorvastatin had muscle aches and generalized malaise. He was willing to start at a lower dose which was prescribed. He forgot to take it after prior appointment but was wiling to start again. Since prior appointment he started drinking soda again and has continued smoking but he knows what he needs to do and has made some goals that he plans to work on after [place].     Erectile dysfunction, unspecified erectile dysfunction type  Bipolar 2 disorder   Primary hypertension  Stable. Requested refills.     Screening Exams:   -Colonoscopy- Had cologuard about 3.5 years ago. Ordered repeat on 1/11/23.   -HIV, Syphilis, Chlamydia, and Gonorrhea- had previously, declined  repeat testing.   -AAA- ordered 9/19/2023   -CT Thorax- ordered 9/19/2023   Vaccines:   -Influenza vaccination: 2023  -Tdap/TD: 2019   -Zoster vaccine: did not have chicken pox as a child.   -COVID: has had 4 shots.       ROS: As per HPI. Additional pertinent systems as noted below.  Constitutional: Negative for chills and fever.   Respiratory: Negative for cough, wheezing and shortness of breath.  Positive for congestion and cough.   Cardiovascular: Negative for chest pain, palpitations and leg swelling.   Gastrointestinal: Negative for abdominal pain, constipation, diarrhea, heartburn, nausea and vomiting.   Genitourinary: Negative for dysuria, flank pain and hematuria.   Musculoskeletal: Negative for falls and myalgias.   Skin: Negative for itching and rash.   Neurological: Negative for dizziness, seizures, loss of consciousness and headaches.       No past medical history on file.  Social History     Tobacco Use    Smoking status: Every Day     Current packs/day: 1.00     Types: Cigarettes    Smokeless  "tobacco: Never    Tobacco comments:     20 cigarettes daily   Vaping Use    Vaping Use: Never used   Substance Use Topics    Alcohol use: Not Currently     Comment: occational until around 7/2022 he quit to support his wife quitting.    Drug use: Never     Current Outpatient Medications   Medication Sig Dispense Refill    atorvastatin (LIPITOR) 20 MG Tab Take 1 Tablet by mouth every evening. 90 Tablet 3    quetiapine (SEROQUEL) 300 MG tablet Take 1 Tablet by mouth every evening. 90 90 Tablet 3    lisinopril-hydrochlorothiazide (PRINZIDE) 10-12.5 MG per tablet Take 1 Tablet by mouth every day. 90 Tablet 3    tadalafil (CIALIS) 10 MG tablet Take 2 Tablets by mouth 1 time a day as needed for Erectile Dysfunction. 30 Tablet 11     No current facility-administered medications for this visit.       Physical Exam  /72 (BP Location: Left arm, Patient Position: Sitting, BP Cuff Size: Large adult)   Pulse 95   Temp 36.8 °C (98.2 °F) (Temporal)   Ht 1.88 m (6' 2\")   Wt (!) 129 kg (285 lb 6.4 oz)   SpO2 93%   BMI 36.64 kg/m²   General:  Alert and oriented, No apparent distress.    Lungs: Clear to auscultation. No wheezes, rales, or rhonchi.     Cardiovascular: Regular rate and rhythm. No murmurs, rubs or gallops.    Abdomen:  Regular bowel sounds, nontender, no rebound or guarding noted.    Extremities: No clubbing, cyanosis, edema.    Neuro: Aox4, strength 5/5 bilat upper and lower extremities.       Assessment and Plan:   1. Erectile dysfunction, unspecified erectile dysfunction type  Ddx likely multifactorial due to smoking and weight. Possible low testosterone, high cholesterol, DM, or sleep disorder.   -Some improvement with tadalafil; reported that it is working better than sildenafil.   Plan:   -Explained how weight can increase estrogen and decrease testosterone.   -Discussed smoking cessation and weight loss (see plan for Class II Obesity)   -Refilled tadalafil (CIALIS) 10 MG tablet; Take 2 Tablets by mouth " 1 time a day as needed for Erectile Dysfunction.  Dispense: 30 Tablet; Refill: 11    2. Bipolar 2 disorder (HCC)  -Dxed in 2002, has been on Quetiapine 300mg since then. Stable on current medication.   Plan:   -Refilled quetiapine (SEROQUEL) 300 MG tablet; Take 1 Tablet by mouth every evening. 90  Dispense: 90 Tablet; Refill: 3    3. Primary hypertension  BP is 120s/80s on home BP log. The patient denies any episodes of CP, palpitations, SOB, lightheadedness/dizziness, or blurred vision.  Plan:   -Refilled lisinopril-hydrochlorothiazide (PRINZIDE) 10-12.5 MG per tablet; Take 1 Tablet by mouth every day.  Dispense: 90 Tablet; Refill: 3    4. Hyperlipidemia, unspecified hyperlipidemia type  -Recent labs notable for A1C 6.1, Chol 206, HDL 33, and Tri 490. He meets Dx criteria for prediabetes and hyperlipidemia.   -ASCVD score of 18% due to multiple risk factors or obesity, smoking, htn, and elevated LDL.  -Initially started on Atorvastatin 40mg, however pt started having muscle cramp, decreased to Atorvastatin 20mg on 9/19/2023.   Plan:   -Refilled atorvastatin (LIPITOR) 20 MG Tab; Take 1 Tablet by mouth every evening.  Dispense: 90 Tablet; Refill: 3    5. Screening for colon cancer  - COLOGUARD (FIT DNA)        Chronic medical conditions not addressed at this appointment:   Class 2 obesity   -currently he is working on quitting soda and went about 2 months without any soda. -Encouraged pt to continue making small changes.   -Pt also reported increasing amount of vegetables in his diet and said that he is trying to eat more chinese food. Discussed and recommended mediterranean diet, low carbohydrates, low salt.     Follow up: 3 months     Merlin Zazueta D.O. PGY 2  [facility]

## 2024-03-06 ENCOUNTER — APPOINTMENT (OUTPATIENT)
Dept: INTERNAL MEDICINE | Facility: OTHER | Age: 59
End: 2024-03-06
Payer: MEDICARE

## 2024-10-10 ENCOUNTER — OFFICE VISIT (OUTPATIENT)
Dept: INTERNAL MEDICINE | Facility: OTHER | Age: 59
End: 2024-10-10
Payer: MEDICARE

## 2024-10-10 VITALS
OXYGEN SATURATION: 97 % | TEMPERATURE: 98.2 F | SYSTOLIC BLOOD PRESSURE: 131 MMHG | DIASTOLIC BLOOD PRESSURE: 62 MMHG | HEART RATE: 85 BPM | BODY MASS INDEX: 39.42 KG/M2 | WEIGHT: 307.2 LBS | HEIGHT: 74 IN

## 2024-10-10 DIAGNOSIS — N52.9 ERECTILE DYSFUNCTION, UNSPECIFIED ERECTILE DYSFUNCTION TYPE: ICD-10-CM

## 2024-10-10 DIAGNOSIS — D72.829 LEUKOCYTOSIS, UNSPECIFIED TYPE: ICD-10-CM

## 2024-10-10 DIAGNOSIS — R73.03 PREDIABETES: ICD-10-CM

## 2024-10-10 DIAGNOSIS — E78.5 HYPERLIPIDEMIA, UNSPECIFIED HYPERLIPIDEMIA TYPE: ICD-10-CM

## 2024-10-10 DIAGNOSIS — E66.9 OBESITY (BMI 35.0-39.9 WITHOUT COMORBIDITY): ICD-10-CM

## 2024-10-10 DIAGNOSIS — F31.81 BIPOLAR 2 DISORDER (HCC): ICD-10-CM

## 2024-10-10 DIAGNOSIS — I10 PRIMARY HYPERTENSION: ICD-10-CM

## 2024-10-10 PROCEDURE — 99214 OFFICE O/P EST MOD 30 MIN: CPT | Mod: GC

## 2024-10-10 PROCEDURE — 3075F SYST BP GE 130 - 139MM HG: CPT | Mod: GC

## 2024-10-10 PROCEDURE — 3078F DIAST BP <80 MM HG: CPT | Mod: GC

## 2024-10-10 RX ORDER — QUETIAPINE FUMARATE 300 MG/1
300 TABLET, FILM COATED ORAL EVERY EVENING
Qty: 90 TABLET | Refills: 1 | Status: SHIPPED | OUTPATIENT
Start: 2024-10-10 | End: 2024-10-10

## 2024-10-10 RX ORDER — TADALAFIL 10 MG/1
20 TABLET ORAL
Qty: 30 TABLET | Refills: 3 | Status: SHIPPED | OUTPATIENT
Start: 2024-10-10 | End: 2024-10-10

## 2024-10-10 RX ORDER — TADALAFIL 10 MG/1
20 TABLET ORAL
Qty: 30 TABLET | Refills: 3 | Status: SHIPPED | OUTPATIENT
Start: 2024-10-10

## 2024-10-10 RX ORDER — QUETIAPINE FUMARATE 300 MG/1
300 TABLET, FILM COATED ORAL EVERY EVENING
Qty: 90 TABLET | Refills: 1 | Status: SHIPPED | OUTPATIENT
Start: 2024-10-10

## 2024-10-10 RX ORDER — LISINOPRIL AND HYDROCHLOROTHIAZIDE 10; 12.5 MG/1; MG/1
1 TABLET ORAL DAILY
Qty: 90 TABLET | Refills: 1 | Status: SHIPPED | OUTPATIENT
Start: 2024-10-10 | End: 2025-04-08

## 2024-10-10 RX ORDER — ATORVASTATIN CALCIUM 20 MG/1
20 TABLET, FILM COATED ORAL NIGHTLY
Qty: 90 TABLET | Refills: 1 | Status: SHIPPED | OUTPATIENT
Start: 2024-10-10 | End: 2024-10-10

## 2024-10-10 RX ORDER — LISINOPRIL AND HYDROCHLOROTHIAZIDE 10; 12.5 MG/1; MG/1
1 TABLET ORAL DAILY
Qty: 90 TABLET | Refills: 1 | Status: SHIPPED | OUTPATIENT
Start: 2024-10-10 | End: 2024-10-10

## 2024-10-10 RX ORDER — ATORVASTATIN CALCIUM 20 MG/1
20 TABLET, FILM COATED ORAL NIGHTLY
Qty: 90 TABLET | Refills: 1 | Status: SHIPPED | OUTPATIENT
Start: 2024-10-10 | End: 2025-04-08

## 2024-10-10 ASSESSMENT — FIBROSIS 4 INDEX: FIB4 SCORE: 0.74

## 2025-02-04 ENCOUNTER — TELEPHONE (OUTPATIENT)
Dept: INTERNAL MEDICINE | Facility: OTHER | Age: 60
End: 2025-02-04
Payer: MEDICARE

## 2025-02-05 NOTE — TELEPHONE ENCOUNTER
Received a call from the back line regarding patient's medication.   She said that patient states that he usually gets his medications as a 90-day supply, but this time it is only a 30-day supply.   According to our script, he should have 90-days available to him.     Will have to call pharmacy to verify.     Patient also wants to change pharmacy to [facility] on [address].

## 2025-02-06 NOTE — TELEPHONE ENCOUNTER
Spoke with pharmacist.   She informed me that he picked up his Lisinopril, Atorvastatin, and Seroquel on 2/4.     He also still has 2 refills left of the Tadalafil.

## 2025-02-06 NOTE — TELEPHONE ENCOUNTER
Phone Number Called: [phone]    Call outcome: Spoke to patient regarding message below.    Message: Confirmed that patient picked up his lisinopril, atorvastatin, and Seroquel.   Updated his pharmacy to [facility] on [address].   Closing encounter.

## 2025-03-06 DIAGNOSIS — F31.81 BIPOLAR 2 DISORDER (HCC): ICD-10-CM

## 2025-03-06 NOTE — TELEPHONE ENCOUNTER
Received request via: Pharmacy    Was the patient seen in the last year in this department? Yes    Does the patient have an active prescription (recently filled or refills available) for medication(s) requested? No    Pharmacy Name: [facility] [id number] - [place] - [address]     Does the patient have long term Plus and need 100-day supply? (This applies to ALL medications) Patient does not have SCP

## 2025-03-07 RX ORDER — QUETIAPINE FUMARATE 300 MG/1
300 TABLET, FILM COATED ORAL EVERY EVENING
Qty: 90 TABLET | Refills: 1 | Status: SHIPPED | OUTPATIENT
Start: 2025-03-07 | End: 2025-03-12 | Stop reason: SDUPTHER

## 2025-03-12 ENCOUNTER — OFFICE VISIT (OUTPATIENT)
Dept: INTERNAL MEDICINE | Facility: OTHER | Age: 60
End: 2025-03-12
Payer: MEDICARE

## 2025-03-12 VITALS
BODY MASS INDEX: 40.92 KG/M2 | TEMPERATURE: 97 F | OXYGEN SATURATION: 95 % | HEART RATE: 94 BPM | HEIGHT: 72 IN | SYSTOLIC BLOOD PRESSURE: 136 MMHG | DIASTOLIC BLOOD PRESSURE: 83 MMHG | WEIGHT: 302.1 LBS

## 2025-03-12 DIAGNOSIS — I10 PRIMARY HYPERTENSION: ICD-10-CM

## 2025-03-12 DIAGNOSIS — N52.9 ERECTILE DYSFUNCTION, UNSPECIFIED ERECTILE DYSFUNCTION TYPE: ICD-10-CM

## 2025-03-12 DIAGNOSIS — F31.81 BIPOLAR 2 DISORDER (HCC): ICD-10-CM

## 2025-03-12 DIAGNOSIS — E78.5 HYPERLIPIDEMIA, UNSPECIFIED HYPERLIPIDEMIA TYPE: ICD-10-CM

## 2025-03-12 DIAGNOSIS — R73.03 PREDIABETES: ICD-10-CM

## 2025-03-12 DIAGNOSIS — E66.01 CLASS 3 SEVERE OBESITY DUE TO EXCESS CALORIES WITH SERIOUS COMORBIDITY AND BODY MASS INDEX (BMI) OF 40.0 TO 44.9 IN ADULT (HCC): ICD-10-CM

## 2025-03-12 DIAGNOSIS — E66.813 CLASS 3 SEVERE OBESITY DUE TO EXCESS CALORIES WITH SERIOUS COMORBIDITY AND BODY MASS INDEX (BMI) OF 40.0 TO 44.9 IN ADULT (HCC): ICD-10-CM

## 2025-03-12 PROCEDURE — 99213 OFFICE O/P EST LOW 20 MIN: CPT | Mod: GE

## 2025-03-12 PROCEDURE — 3079F DIAST BP 80-89 MM HG: CPT | Mod: GE

## 2025-03-12 PROCEDURE — 3075F SYST BP GE 130 - 139MM HG: CPT | Mod: GE

## 2025-03-12 RX ORDER — LISINOPRIL AND HYDROCHLOROTHIAZIDE 10; 12.5 MG/1; MG/1
1 TABLET ORAL DAILY
Qty: 90 TABLET | Refills: 3 | Status: SHIPPED | OUTPATIENT
Start: 2025-03-12 | End: 2026-03-07

## 2025-03-12 RX ORDER — ATORVASTATIN CALCIUM 20 MG/1
20 TABLET, FILM COATED ORAL NIGHTLY
Qty: 90 TABLET | Refills: 3 | Status: SHIPPED | OUTPATIENT
Start: 2025-03-12 | End: 2026-03-07

## 2025-03-12 RX ORDER — TADALAFIL 10 MG/1
20 TABLET ORAL
Qty: 30 TABLET | Refills: 3 | Status: SHIPPED | OUTPATIENT
Start: 2025-03-12

## 2025-03-12 RX ORDER — QUETIAPINE FUMARATE 300 MG/1
300 TABLET, FILM COATED ORAL EVERY EVENING
Qty: 90 TABLET | Refills: 3 | Status: SHIPPED | OUTPATIENT
Start: 2025-03-12

## 2025-03-12 ASSESSMENT — PATIENT HEALTH QUESTIONNAIRE - PHQ9: CLINICAL INTERPRETATION OF PHQ2 SCORE: 0

## 2025-03-12 ASSESSMENT — ENCOUNTER SYMPTOMS: GENERAL WELL-BEING: FAIR

## 2025-03-12 ASSESSMENT — ACTIVITIES OF DAILY LIVING (ADL): BATHING_REQUIRES_ASSISTANCE: 0

## 2025-03-12 ASSESSMENT — FIBROSIS 4 INDEX: FIB4 SCORE: 0.75

## 2025-03-12 NOTE — PROGRESS NOTES
Established Patient    Rojas Love is a 57 y.o. male who presents today with the following Chief Complaint(s): Follow up for Diagnoses of Erectile dysfunction, unspecified erectile dysfunction type, Bipolar 2 disorder (HCC), Primary hypertension, Hyperlipidemia, unspecified hyperlipidemia type, Prediabetes, and Class 3 severe obesity due to excess calories with serious comorbidity and body mass index (BMI) of 40.0 to 44.9 in adult (HCC) were pertinent to this visit.    HPI:  Overall patient said he is doing well. He and his wife have had some financial difficulties but have been able to get by. He has decreased the amount he smokes from around 2 pks/day to 1/2 pk/day due to cost of cigarettes. He has also been working on eating more fruits and vegetables. He and his wife have been going on walks daily with their dog but overall patient has been less physically active due to quitting his job around a year ago. Overall doing well, just requested a refill of chronic medications.     Screening Exams:   -Colonoscopy- Had cologuard about 3.5 years ago. Ordered repeat on 1/11/23.   -HIV, Syphilis, Chlamydia, and Gonorrhea- had previously, declined  repeat testing.   -AAA- ordered 9/19/2023   -CT Thorax- ordered 9/19/2023   Vaccines:   -Influenza vaccination: 2023  -Tdap/TD: 2019   -Zoster vaccine: did not have chicken pox as a child.   -COVID: has had 4 shots.       No past medical history on file.  Social History     Tobacco Use    Smoking status: Every Day     Current packs/day: 1.00     Types: Cigarettes    Smokeless tobacco: Never    Tobacco comments:     20 cigarettes daily   Vaping Use    Vaping status: Never Used   Substance Use Topics    Alcohol use: Not Currently     Comment: occational until around 7/2022 he quit to support his wife quitting.    Drug use: Never     Current Outpatient Medications   Medication Sig Dispense Refill    tadalafil (CIALIS) 10 MG tablet Take 2 Tablets by mouth 1 time a day  "as needed for Erectile Dysfunction. 30 Tablet 3    quetiapine (SEROQUEL) 300 MG tablet Take 1 Tablet by mouth every evening. 90 90 Tablet 3    lisinopril-hydrochlorothiazide (PRINZIDE) 10-12.5 MG per tablet Take 1 Tablet by mouth every day for 360 days. 90 Tablet 3    atorvastatin (LIPITOR) 20 MG Tab Take 1 Tablet by mouth every evening for 360 days. 90 Tablet 3     No current facility-administered medications for this visit.       Physical Exam  /83 (BP Location: Left arm, Patient Position: Sitting, BP Cuff Size: Large adult)   Pulse 94   Temp 36.1 °C (97 °F) (Temporal)   Ht 1.841 m (6' 0.48\")   Wt (!) 137 kg (302 lb 1.6 oz)   SpO2 95%   BMI 40.43 kg/m²   General:  Alert and oriented, No apparent distress.    Lungs: Clear to auscultation. No wheezes, rales, or rhonchi.     Cardiovascular: Regular rate and rhythm. No murmurs, rubs or gallops.    Abdomen:  Regular bowel sounds, nontender, no rebound or guarding noted.    Extremities: No clubbing, cyanosis, edema.    Neuro: Aox4, strength 5/5 bilat upper and lower extremities.       Assessment and Plan:   1. Erectile dysfunction, unspecified erectile dysfunction type  Ddx likely multifactorial due to smoking and weight. Possible low testosterone, high cholesterol, DM. No sx sleep disorder/sleep apnea.   -Some improvement with tadalafil; working better than sildenafil.   Plan:   -Discussed smoking cessation and weight loss (see plan for Class II Obesity)   -Refilled tadalafil (CIALIS) 10 MG tablet; Take 2 Tablets by mouth 1 time a day as needed for Erectile Dysfunction.  Dispense: 30 Tablet; Refill: 3    2. Bipolar 2 disorder (HCC)  -Dxed in 2002, has been on Quetiapine 300mg since then. Stable on current medication.   - quetiapine (SEROQUEL) 300 MG tablet; Take 1 Tablet by mouth every evening. 90  Dispense: 90 Tablet; Refill: 3    3. Primary hypertension  BP is 120s/80s on home BP log. The patient denies any episodes of CP, palpitations, SOB, " lightheadedness/dizziness, or blurred vision.  Plan:   - lisinopril-hydrochlorothiazide (PRINZIDE) 10-12.5 MG per tablet; Take 1 Tablet by mouth every day for 360 days.  Dispense: 90 Tablet; Refill: 3    4. Hyperlipidemia, unspecified hyperlipidemia type  -Recent labs notable for A1C 6.1, Chol 206, HDL 33, and Tri 490. He meets Dx criteria for prediabetes and hyperlipidemia.   -ASCVD score of 18% due to multiple risk factors or obesity, smoking, htn, and elevated LDL.  -Initially started on Atorvastatin 40mg, however pt started having muscle cramp, decreased to Atorvastatin 20mg on 9/19/2023.   Plan:   -Refilled atorvastatin (LIPITOR) 20 MG Tab; Take 1 Tablet by mouth every evening for 360 days.  Dispense: 90 Tablet; Refill: 3    5. Prediabetes  Most recent A1C of 6.1 on 3/31/23   - Comp Metabolic Panel; Future  - HEMOGLOBIN A1C; Future    6. Class 3 severe obesity due to excess calories with serious comorbidity and body mass index (BMI) of 40.0 to 44.9 in adult (HCC)  Encouraged pt to continue making small changes. Continue going on walks daily and continue eating lots of fruits/vegetables. Encouraged to go on hikes with his wife as well.   Plan:   - Lipid Profile; Future  - HEMOGLOBIN A1C; Future      Follow up: 6 months to re-establish care     Merlin Zazueta D.O. PGY 3  [facility]